# Patient Record
Sex: FEMALE | Race: BLACK OR AFRICAN AMERICAN | NOT HISPANIC OR LATINO | URBAN - METROPOLITAN AREA
[De-identification: names, ages, dates, MRNs, and addresses within clinical notes are randomized per-mention and may not be internally consistent; named-entity substitution may affect disease eponyms.]

---

## 2024-10-04 ENCOUNTER — EMERGENCY (EMERGENCY)
Facility: HOSPITAL | Age: 54
LOS: 1 days | Discharge: HOME | End: 2024-10-04
Admitting: STUDENT IN AN ORGANIZED HEALTH CARE EDUCATION/TRAINING PROGRAM
Payer: COMMERCIAL

## 2024-10-04 VITALS
RESPIRATION RATE: 18 BRPM | TEMPERATURE: 99 F | DIASTOLIC BLOOD PRESSURE: 94 MMHG | WEIGHT: 188.94 LBS | HEART RATE: 79 BPM | SYSTOLIC BLOOD PRESSURE: 149 MMHG | OXYGEN SATURATION: 98 %

## 2024-10-04 VITALS
OXYGEN SATURATION: 97 % | SYSTOLIC BLOOD PRESSURE: 131 MMHG | TEMPERATURE: 99 F | RESPIRATION RATE: 18 BRPM | DIASTOLIC BLOOD PRESSURE: 87 MMHG | HEART RATE: 69 BPM

## 2024-10-04 LAB
ALBUMIN SERPL ELPH-MCNC: 3.9 G/DL — SIGNIFICANT CHANGE UP (ref 3.3–5)
ALP SERPL-CCNC: 102 U/L — SIGNIFICANT CHANGE UP (ref 40–120)
ALT FLD-CCNC: 58 U/L — HIGH (ref 4–33)
ANION GAP SERPL CALC-SCNC: 15 MMOL/L — HIGH (ref 7–14)
APTT BLD: 39.2 SEC — HIGH (ref 24.5–35.6)
AST SERPL-CCNC: 30 U/L — SIGNIFICANT CHANGE UP (ref 4–32)
BASOPHILS # BLD AUTO: 0.04 K/UL — SIGNIFICANT CHANGE UP (ref 0–0.2)
BASOPHILS NFR BLD AUTO: 0.4 % — SIGNIFICANT CHANGE UP (ref 0–2)
BILIRUB SERPL-MCNC: 0.4 MG/DL — SIGNIFICANT CHANGE UP (ref 0.2–1.2)
BLD GP AB SCN SERPL QL: NEGATIVE — SIGNIFICANT CHANGE UP
BUN SERPL-MCNC: 10 MG/DL — SIGNIFICANT CHANGE UP (ref 7–23)
CALCIUM SERPL-MCNC: 9.9 MG/DL — SIGNIFICANT CHANGE UP (ref 8.4–10.5)
CHLORIDE SERPL-SCNC: 103 MMOL/L — SIGNIFICANT CHANGE UP (ref 98–107)
CO2 SERPL-SCNC: 25 MMOL/L — SIGNIFICANT CHANGE UP (ref 22–31)
CREAT SERPL-MCNC: 0.75 MG/DL — SIGNIFICANT CHANGE UP (ref 0.5–1.3)
EGFR: 94 ML/MIN/1.73M2 — SIGNIFICANT CHANGE UP
EOSINOPHIL # BLD AUTO: 0.32 K/UL — SIGNIFICANT CHANGE UP (ref 0–0.5)
EOSINOPHIL NFR BLD AUTO: 3.3 % — SIGNIFICANT CHANGE UP (ref 0–6)
GLUCOSE SERPL-MCNC: 97 MG/DL — SIGNIFICANT CHANGE UP (ref 70–99)
HCT VFR BLD CALC: 35.6 % — SIGNIFICANT CHANGE UP (ref 34.5–45)
HGB BLD-MCNC: 11 G/DL — LOW (ref 11.5–15.5)
IANC: 5.52 K/UL — SIGNIFICANT CHANGE UP (ref 1.8–7.4)
IMM GRANULOCYTES NFR BLD AUTO: 0.8 % — SIGNIFICANT CHANGE UP (ref 0–0.9)
INR BLD: 1.04 RATIO — SIGNIFICANT CHANGE UP (ref 0.85–1.16)
LIDOCAIN IGE QN: 33 U/L — SIGNIFICANT CHANGE UP (ref 7–60)
LYMPHOCYTES # BLD AUTO: 2.81 K/UL — SIGNIFICANT CHANGE UP (ref 1–3.3)
LYMPHOCYTES # BLD AUTO: 29 % — SIGNIFICANT CHANGE UP (ref 13–44)
MCHC RBC-ENTMCNC: 20.5 PG — LOW (ref 27–34)
MCHC RBC-ENTMCNC: 30.9 GM/DL — LOW (ref 32–36)
MCV RBC AUTO: 66.3 FL — LOW (ref 80–100)
MONOCYTES # BLD AUTO: 0.93 K/UL — HIGH (ref 0–0.9)
MONOCYTES NFR BLD AUTO: 9.6 % — SIGNIFICANT CHANGE UP (ref 2–14)
NEUTROPHILS # BLD AUTO: 5.52 K/UL — SIGNIFICANT CHANGE UP (ref 1.8–7.4)
NEUTROPHILS NFR BLD AUTO: 56.9 % — SIGNIFICANT CHANGE UP (ref 43–77)
NRBC # BLD: 0 /100 WBCS — SIGNIFICANT CHANGE UP (ref 0–0)
NRBC # FLD: 0 K/UL — SIGNIFICANT CHANGE UP (ref 0–0)
PLATELET # BLD AUTO: 412 K/UL — HIGH (ref 150–400)
POTASSIUM SERPL-MCNC: 3.6 MMOL/L — SIGNIFICANT CHANGE UP (ref 3.5–5.3)
POTASSIUM SERPL-SCNC: 3.6 MMOL/L — SIGNIFICANT CHANGE UP (ref 3.5–5.3)
PROT SERPL-MCNC: 8 G/DL — SIGNIFICANT CHANGE UP (ref 6–8.3)
PROTHROM AB SERPL-ACNC: 12.1 SEC — SIGNIFICANT CHANGE UP (ref 9.9–13.4)
RBC # BLD: 5.37 M/UL — HIGH (ref 3.8–5.2)
RBC # FLD: 15.6 % — HIGH (ref 10.3–14.5)
RH IG SCN BLD-IMP: POSITIVE — SIGNIFICANT CHANGE UP
SODIUM SERPL-SCNC: 143 MMOL/L — SIGNIFICANT CHANGE UP (ref 135–145)
WBC # BLD: 9.7 K/UL — SIGNIFICANT CHANGE UP (ref 3.8–10.5)
WBC # FLD AUTO: 9.7 K/UL — SIGNIFICANT CHANGE UP (ref 3.8–10.5)

## 2024-10-04 PROCEDURE — 99285 EMERGENCY DEPT VISIT HI MDM: CPT

## 2024-10-04 PROCEDURE — 76705 ECHO EXAM OF ABDOMEN: CPT | Mod: 26

## 2024-10-04 NOTE — CONSULT NOTE ADULT - SUBJECTIVE AND OBJECTIVE BOX
*** SURGERY CONSULT NOTE    Consulting Team:     Patient: IRASEMA MCBRIDE , 54y ([birth year])Female   MRN: [id number]  Location: [facility] ED  Visit: 10-04-24 Emergency  Date: 10-04-24 @ 16:44      HPI: 54F with no pmh referred from urgent care for acute cholecystitis on CT Scan (performed yesterday). Pt states she had intermittent RUQ/ epigastric abd discomfort x 5-6 days. Pt had 2 episodes of nbnb emesis 2 days ago which is now resolved. Pain is completely resolved now. Denies any further episodes of vomiting, diarrhea, fever , flank pain urinary symptoms. In the ED AVSS, labs unremarkable, and US+ mild GB thickening with stones/sludge.      PAST MEDICAL HISTORY:      PAST SURGICAL HISTORY:      MEDICATIONS:      ALLERGIES:  No Known Allergies      VITALS & I/Os:  Vital Signs Last 24 Hrs  T(C): 37.1 (04 Oct 2024 16:20), Max: 37.1 (04 Oct 2024 16:20)  T(F): 98.7 (04 Oct 2024 16:20), Max: 98.7 (04 Oct 2024 16:20)  HR: 69 (04 Oct 2024 16:20) (69 - 79)  BP: 131/87 (04 Oct 2024 16:20) (131/87 - 149/94)  BP(mean): --  RR: 18 (04 Oct 2024 16:20) (18 - 18)  SpO2: 97% (04 Oct 2024 16:20) (97% - 98%)    Parameters below as of 04 Oct 2024 16:20  Patient On (Oxygen Delivery Method): room air        I&O's Summary      PHYSICAL EXAM:  General: No acute distress  Respiratory: Nonlabored  Cardiovascular: RRR  Abdominal: Soft, nondistended, nontender  Extremities: Warm    LABS:                        11.0   9.70  )-----------( 412      ( 04 Oct 2024 13:12 )             35.6     10-04    143  |  103  |  10  ----------------------------<  97  3.6   |  25  |  0.75    Ca    9.9      04 Oct 2024 13:12    TPro  8.0  /  Alb  3.9  /  TBili  0.4  /  DBili  x   /  AST  30  /  ALT  58[H]  /  AlkPhos  102  10-04    Lactate:    PT/INR - ( 04 Oct 2024 13:12 )   PT: 12.1 sec;   INR: 1.04 ratio         PTT - ( 04 Oct 2024 13:12 )  PTT:39.2 sec          Urinalysis Basic - ( 04 Oct 2024 13:12 )    Color: x / Appearance: x / SG: x / pH: x  Gluc: 97 mg/dL / Ketone: x  / Bili: x / Urobili: x   Blood: x / Protein: x / Nitrite: x   Leuk Esterase: x / RBC: x / WBC x   Sq Epi: x / Non Sq Epi: x / Bacteria: x          IMAGING:

## 2024-10-04 NOTE — ED PROVIDER NOTE - CARE PROVIDER_API CALL
Tanner Carrera  Surgery  [address], [address] [address]  Phone: [phone]  Fax: [phone]  Follow Up Time: 7-10 Days

## 2024-10-04 NOTE — ED PROVIDER NOTE - CLINICAL SUMMARY MEDICAL DECISION MAKING FREE TEXT BOX
54F with no pmh referred from urgent care for acute cholecystitis on CT Scan (performed yesterday). Pt states she had intermittent RUQ/ epigastric abd discomfort x 5-6 days. Pt currently very well appearing, no tenderness on exam. Will repeat labs and RUQ u/s to eval for acute cholecystitis. If u/s does not show any acute catrina, labs wnl and pt is comfortable, will possibly plan for d/c home with surgery referral

## 2024-10-04 NOTE — ED PROVIDER NOTE - PATIENT PORTAL LINK FT
You can access the FollowMyHealth Patient Portal offered by [facility] by registering at the following website: [email or web address]. By joining State’s FollowMyHealth portal, you will also be able to view your health information using other applications (apps) compatible with our system.

## 2024-10-04 NOTE — ED PROVIDER NOTE - OBJECTIVE STATEMENT
54F with no pmh referred from urgent care for acute cholecystitis on CT Scan (performed yesterday). Pt states she had intermittent RUQ/ epigastric abd discomfort x 5-6 days. Pt had 2 episodes of nbnb emesis 2 days ago which is now resolved. Pain is completely resolved now. Denies any further episodes of vomiting, diarrhea, fever , flank pain urinary symptoms.

## 2024-10-04 NOTE — ED ADULT TRIAGE NOTE - AVIAN FLU SYMPTOMS
Care assumed. Report received from Sridevi HENDERSON RN. Pt resting comfortably. Recently medicated for pain. Spinal not resolved yet. Family member at BS. No distress noted. Pt denies any needs. Will cont to monitor   No

## 2024-10-04 NOTE — ED PROVIDER NOTE - PHYSICAL EXAMINATION
constitutional: well appearing no acute distress, sitting comfortably   HEENT: head- normocephalic, atraumatic   Cardiac: regular rate and rhythm, normal S1 S2 no murmurs rubs or gallops  Respiratory: able to speak in full sentences, no wheezing rales or rhonchi, lungs CTA b/l   Abd: Soft non tender non distended, no guarding, no palpable hernias or masses   Msk: no LE edema, no tenderness b/l   Neuro: A x O x 4

## 2024-10-04 NOTE — ED ADULT TRIAGE NOTE - CHIEF COMPLAINT QUOTE
Pt. c/o RUQ pain with nausea and vomiting starting on Friday. States CT showed gallstones and cholecystitis.

## 2024-10-04 NOTE — ED PROVIDER NOTE - PROGRESS NOTE DETAILS
CHIOMA Jack: Labs wnl. US showing GB wall edema and possible acute catrina. Gen surg consulted and evaluated at bedside. Pending further recs

## 2024-10-04 NOTE — ED ADULT NURSE NOTE - OBJECTIVE STATEMENT
Pt received to wellness room 3. Pt A&O x 3, ambulatory. Pt c/o RUQ pain since Friday with nausea and vomiting that has subsided since. Pt endorses she was seen at urgent care on Wednesday and CT showed a gallstone. Pt denies medical Hx. Pt denies dizziness, headache, vision changes, chest pain, SOB, N&V, or urinary symptoms. Respirations even and unlabored. +2 pulses in all extremities. 20G IV placed. Labs drawn and sent. Safety maintained. Pending lab results and imaging. Pt received to wellness room 3. Pt A&O x 3, ambulatory. Pt c/o RUQ pain since Friday with nausea and vomiting that has subsided since. Pt endorses she was seen at urgent care on Wednesday and CT showed a gallstone. Pt denies medical Hx. Pt denies dizziness, headache, vision changes, chest pain, SOB, N&V, or urinary symptoms. Respirations even and unlabored. +2 pulses in all extremities. 20G IV placed in right AC. Labs drawn and sent. Safety maintained. Pending lab results and imaging.

## 2024-10-04 NOTE — CONSULT NOTE ADULT - ASSESSMENT
54F with no pmh referred from urgent care for acute cholecystitis on CT Scan (performed yesterday). Pt states she had intermittent RUQ/ epigastric abd discomfort x 5-6 days that has since completely resolved and is NTTP on PE. In the ED AVSS, labs unremarkable, and US+ mild GB thickening with stones/sludge.    Rec  - Patient with likely resolved acute choly given benign exam and labs WNL>would rec interval f/u however for elective cholecystectomy in 1-2 weeks  - Patient can f/u with Dr. Carrera in clinic in 1-2 weeks  - Will rec PO augmentin x7 days  - small and low fat bites for diet until f/u  - Patient encouraged to return to ED if worsening pain, n/v, fever/chills  - Plan discussed with Dr. Carrera      B Team [id number]